# Patient Record
Sex: FEMALE | Race: OTHER | HISPANIC OR LATINO | URBAN - METROPOLITAN AREA
[De-identification: names, ages, dates, MRNs, and addresses within clinical notes are randomized per-mention and may not be internally consistent; named-entity substitution may affect disease eponyms.]

---

## 2023-03-03 ENCOUNTER — EMERGENCY (EMERGENCY)
Facility: HOSPITAL | Age: 88
LOS: 1 days | Discharge: HOME | End: 2023-03-03
Attending: STUDENT IN AN ORGANIZED HEALTH CARE EDUCATION/TRAINING PROGRAM
Payer: SELF-PAY

## 2023-03-03 VITALS
HEIGHT: 60 IN | RESPIRATION RATE: 20 BRPM | OXYGEN SATURATION: 98 % | SYSTOLIC BLOOD PRESSURE: 137 MMHG | HEART RATE: 81 BPM | TEMPERATURE: 98 F | DIASTOLIC BLOOD PRESSURE: 54 MMHG | WEIGHT: 110.01 LBS

## 2023-03-03 VITALS
SYSTOLIC BLOOD PRESSURE: 126 MMHG | OXYGEN SATURATION: 98 % | TEMPERATURE: 98 F | DIASTOLIC BLOOD PRESSURE: 55 MMHG | RESPIRATION RATE: 18 BRPM | HEART RATE: 74 BPM

## 2023-03-03 LAB
ALBUMIN SERPL ELPH-MCNC: 3.2 G/DL — LOW (ref 3.3–5.2)
ALP SERPL-CCNC: 92 U/L — SIGNIFICANT CHANGE UP (ref 40–120)
ALT FLD-CCNC: 28 U/L — SIGNIFICANT CHANGE UP
ANION GAP SERPL CALC-SCNC: 12 MMOL/L — SIGNIFICANT CHANGE UP (ref 5–17)
ANISOCYTOSIS BLD QL: SLIGHT — SIGNIFICANT CHANGE UP
APTT BLD: 32.6 SEC — SIGNIFICANT CHANGE UP (ref 27.5–35.5)
AST SERPL-CCNC: 18 U/L — SIGNIFICANT CHANGE UP
BASOPHILS # BLD AUTO: 0 K/UL — SIGNIFICANT CHANGE UP (ref 0–0.2)
BASOPHILS NFR BLD AUTO: 0 % — SIGNIFICANT CHANGE UP (ref 0–2)
BILIRUB SERPL-MCNC: 0.4 MG/DL — SIGNIFICANT CHANGE UP (ref 0.4–2)
BUN SERPL-MCNC: 36.5 MG/DL — HIGH (ref 8–20)
BURR CELLS BLD QL SMEAR: PRESENT — SIGNIFICANT CHANGE UP
CALCIUM SERPL-MCNC: 9.3 MG/DL — SIGNIFICANT CHANGE UP (ref 8.4–10.5)
CHLORIDE SERPL-SCNC: 104 MMOL/L — SIGNIFICANT CHANGE UP (ref 96–108)
CO2 SERPL-SCNC: 21 MMOL/L — LOW (ref 22–29)
CREAT SERPL-MCNC: 0.89 MG/DL — SIGNIFICANT CHANGE UP (ref 0.5–1.3)
DACRYOCYTES BLD QL SMEAR: SLIGHT — SIGNIFICANT CHANGE UP
EGFR: 63 ML/MIN/1.73M2 — SIGNIFICANT CHANGE UP
EOSINOPHIL # BLD AUTO: 0.08 K/UL — SIGNIFICANT CHANGE UP (ref 0–0.5)
EOSINOPHIL NFR BLD AUTO: 0.9 % — SIGNIFICANT CHANGE UP (ref 0–6)
GIANT PLATELETS BLD QL SMEAR: PRESENT — SIGNIFICANT CHANGE UP
GLUCOSE SERPL-MCNC: 116 MG/DL — HIGH (ref 70–99)
HCT VFR BLD CALC: 33.8 % — LOW (ref 34.5–45)
HGB BLD-MCNC: 10.5 G/DL — LOW (ref 11.5–15.5)
HIV 1 & 2 AB SERPL IA.RAPID: SIGNIFICANT CHANGE UP
INR BLD: 1.01 RATIO — SIGNIFICANT CHANGE UP (ref 0.88–1.16)
LYMPHOCYTES # BLD AUTO: 2.11 K/UL — SIGNIFICANT CHANGE UP (ref 1–3.3)
LYMPHOCYTES # BLD AUTO: 22.6 % — SIGNIFICANT CHANGE UP (ref 13–44)
MACROCYTES BLD QL: SLIGHT — SIGNIFICANT CHANGE UP
MANUAL SMEAR VERIFICATION: SIGNIFICANT CHANGE UP
MCHC RBC-ENTMCNC: 30.2 PG — SIGNIFICANT CHANGE UP (ref 27–34)
MCHC RBC-ENTMCNC: 31.1 GM/DL — LOW (ref 32–36)
MCV RBC AUTO: 97.1 FL — SIGNIFICANT CHANGE UP (ref 80–100)
MONOCYTES # BLD AUTO: 0.64 K/UL — SIGNIFICANT CHANGE UP (ref 0–0.9)
MONOCYTES NFR BLD AUTO: 6.9 % — SIGNIFICANT CHANGE UP (ref 2–14)
NEUTROPHILS # BLD AUTO: 6.4 K/UL — SIGNIFICANT CHANGE UP (ref 1.8–7.4)
NEUTROPHILS NFR BLD AUTO: 67 % — SIGNIFICANT CHANGE UP (ref 43–77)
NEUTS BAND # BLD: 1.7 % — SIGNIFICANT CHANGE UP (ref 0–8)
OVALOCYTES BLD QL SMEAR: SLIGHT — SIGNIFICANT CHANGE UP
PLAT MORPH BLD: NORMAL — SIGNIFICANT CHANGE UP
PLATELET # BLD AUTO: 122 K/UL — LOW (ref 150–400)
POIKILOCYTOSIS BLD QL AUTO: SLIGHT — SIGNIFICANT CHANGE UP
POLYCHROMASIA BLD QL SMEAR: SLIGHT — SIGNIFICANT CHANGE UP
POTASSIUM SERPL-MCNC: 4.6 MMOL/L — SIGNIFICANT CHANGE UP (ref 3.5–5.3)
POTASSIUM SERPL-SCNC: 4.6 MMOL/L — SIGNIFICANT CHANGE UP (ref 3.5–5.3)
PROT SERPL-MCNC: 6.9 G/DL — SIGNIFICANT CHANGE UP (ref 6.6–8.7)
PROTHROM AB SERPL-ACNC: 11.7 SEC — SIGNIFICANT CHANGE UP (ref 10.5–13.4)
RBC # BLD: 3.48 M/UL — LOW (ref 3.8–5.2)
RBC # FLD: 25.2 % — HIGH (ref 10.3–14.5)
RBC BLD AUTO: SIGNIFICANT CHANGE UP
SODIUM SERPL-SCNC: 136 MMOL/L — SIGNIFICANT CHANGE UP (ref 135–145)
VARIANT LYMPHS # BLD: 0.9 % — SIGNIFICANT CHANGE UP (ref 0–6)
WBC # BLD: 9.32 K/UL — SIGNIFICANT CHANGE UP (ref 3.8–10.5)
WBC # FLD AUTO: 9.32 K/UL — SIGNIFICANT CHANGE UP (ref 3.8–10.5)

## 2023-03-03 PROCEDURE — 99284 EMERGENCY DEPT VISIT MOD MDM: CPT

## 2023-03-03 PROCEDURE — 86703 HIV-1/HIV-2 1 RESULT ANTBDY: CPT

## 2023-03-03 PROCEDURE — 70450 CT HEAD/BRAIN W/O DYE: CPT | Mod: MA

## 2023-03-03 PROCEDURE — 80053 COMPREHEN METABOLIC PANEL: CPT

## 2023-03-03 PROCEDURE — 85610 PROTHROMBIN TIME: CPT

## 2023-03-03 PROCEDURE — 99284 EMERGENCY DEPT VISIT MOD MDM: CPT | Mod: 25

## 2023-03-03 PROCEDURE — 36415 COLL VENOUS BLD VENIPUNCTURE: CPT

## 2023-03-03 PROCEDURE — 85025 COMPLETE CBC W/AUTO DIFF WBC: CPT

## 2023-03-03 PROCEDURE — 87040 BLOOD CULTURE FOR BACTERIA: CPT

## 2023-03-03 PROCEDURE — 85730 THROMBOPLASTIN TIME PARTIAL: CPT

## 2023-03-03 PROCEDURE — 70450 CT HEAD/BRAIN W/O DYE: CPT | Mod: 26,MA

## 2023-03-03 PROCEDURE — T1013: CPT

## 2023-03-03 RX ORDER — ACETAMINOPHEN 500 MG
650 TABLET ORAL ONCE
Refills: 0 | Status: COMPLETED | OUTPATIENT
Start: 2023-03-03 | End: 2023-03-03

## 2023-03-03 RX ORDER — NIFEDIPINE 30 MG
1 TABLET, EXTENDED RELEASE 24 HR ORAL
Qty: 42 | Refills: 0
Start: 2023-03-03 | End: 2023-03-16

## 2023-03-03 RX ORDER — LOSARTAN POTASSIUM 100 MG/1
1 TABLET, FILM COATED ORAL
Qty: 28 | Refills: 0
Start: 2023-03-03 | End: 2023-03-16

## 2023-03-03 RX ORDER — OMEPRAZOLE 10 MG/1
1 CAPSULE, DELAYED RELEASE ORAL
Qty: 14 | Refills: 0
Start: 2023-03-03 | End: 2023-03-16

## 2023-03-03 RX ORDER — METFORMIN HYDROCHLORIDE 850 MG/1
1 TABLET ORAL
Qty: 28 | Refills: 0
Start: 2023-03-03 | End: 2023-03-16

## 2023-03-03 RX ORDER — HYDROCHLOROTHIAZIDE 25 MG
1 TABLET ORAL
Qty: 14 | Refills: 0
Start: 2023-03-03 | End: 2023-03-16

## 2023-03-03 RX ORDER — LINAGLIPTIN 5 MG/1
1 TABLET, FILM COATED ORAL
Qty: 14 | Refills: 0
Start: 2023-03-03 | End: 2023-03-16

## 2023-03-03 RX ADMIN — Medication 650 MILLIGRAM(S): at 21:32

## 2023-03-03 RX ADMIN — Medication 50 MILLIGRAM(S): at 21:32

## 2023-03-03 NOTE — ED PROVIDER NOTE - PROGRESS NOTE DETAILS
Laureano:  Labs and CT results discussed with family. Daughter is requesting medication to be prescribed as the patient likely is not going ot return to [place] as originally scheduled on march 8th.

## 2023-03-03 NOTE — ED PROVIDER NOTE - NEUROLOGICAL, MLM
Alert and oriented, no focal deficits, no motor or sensory deficits, DTR's symmetric, slight ataxia- at baseline.

## 2023-03-03 NOTE — ED PROVIDER NOTE - PHYSICAL EXAMINATION
Scalp lesions: two large thickened brown plaque like growths lesions along the right parietal scalp ( both about 4x2 cm), and one superficial ulcerated lesion to the left occipital scalp (2x1cm). Non tender, non-erythema, no discharge, and nonodorous.

## 2023-03-03 NOTE — ED PROVIDER NOTE - PATIENT PORTAL LINK FT
You can access the FollowMyHealth Patient Portal offered by [facility] by registering at the following website: [email or web address]. By joining [facility]’s FollowMyHealth portal, you will also be able to view your health information using other applications (apps) compatible with our system.

## 2023-03-03 NOTE — ED PROVIDER NOTE - NSFOLLOWUPINSTRUCTIONS_ED_ALL_ED_FT
1) Sergey un seguimiento con el centro de marie del [facility] para analizar más estudios de maharaj dolor de lottie en el cuero cabelludo y lesiones en el cuero cabelludo.  2) Regrese a la tapan de emergencias por empeoramiento o síntomas preocupantes  3) Considere agregar ibuprofeno y/o paracetamol para controlar el dolor  4) Continúe con maharaj otro medicamento    1) Follow up with the [facility] to discuss further work-up of your scalp head pain and lesions on the scalp  2) Return to the ER for worsening or concerning symptoms  3)Consider adding ibuprofen and/or acetaminophen for pain control  4) Continue with your other medications          Dolor de lottie general sin causa    General Headache Without Cause      El dolor de lottie es un dolor o malestar que se siente en la jayant de la lottie o del adriane. Hay muchas causas y tipos de malvin de lottie. Entre los tipos más frecuentes, se incluyen los siguientes:  •Cefaleas tensionales.      •Cefaleas migrañosas.      •Cefalea en brotes.      •Cefaleas diarias crónicas.      En ocasiones, el dolor de lottie puede no tener diego causa específica.      Siga estas indicaciones en maharaj casa:    Controle maharaj afección para detectar cualquier cambio. Informe a maharaj médico acerca de cualquier cambio. Siga estos pasos para controlar maharaj afección:      Control del dolor       A bag of ice on a towel on the skin.        A heating pad for use on the painful area.      •Use los medicamentos de venta stacy y los recetados solamente jessica se lo haya indicado el médico. El tratamiento puede incluir medicamentos para el dolor que se thu por boca o que se aplican sobre la piel.      •Cuando sienta dolor de lottie acuéstese en un cuarto oscuro y tranquilo.      •Mantenga las luces tenues si las luces brillantes le molestan o holley malvin de lottie empeoran.    •Si se lo indican, aplíquese hielo en la lottie y en la jayant del adriane:  •Ponga el hielo en diego bolsa plástica.      •Coloque diego toalla entre la piel y la bolsa.      •Aplique el hielo isaura 20 minutos, 2 a 3 veces al día.      •Retire el hielo si la piel se pone de color box brillante. [place] es muy importante. Si no puede sentir dolor, calor o frío, tiene un mayor riesgo de que se dañe la jayant.      •Si se lo indican, aplique calor en la jayant afectada. Use la chetna de calor que el médico le recomiende, jessica diego compresa de calor húmedo o diego almohadilla térmica.  •Coloque diego toalla entre la piel y la chetna de calor.      •Aplique calor isaura 20 a 30 minutos.      •Retire la chetna de calor si la piel se pone de color box brillante. [place] es especialmente importante si no puede sentir dolor, calor o frío. Corre un mayor riesgo de sufrir quemaduras.        Comida y bebida     •Mantenga un horario para las comidas.    •Si urban alcohol:•Limite la cantidad que urban a lo siguiente:  •De 0 a 1 medida por día para las mujeres que no están embarazadas.      • De 0 a 2 medidas por día para los hombres.         •Sepa cuánta cantidad de alcohol hay en las bebidas. En [place], diego medida equivale a diego botella de cerveza de 12 oz (355 ml), un vaso de vino de 5 oz (148 ml) o un vaso de diego bebida alcohólica de gilda graduación de 1½ oz (44 ml).        •Deje de mart cafeína o disminuya la cantidad que consume.      •Melissa suficiente líquido jessica para mantener la orina de color amarillo pálido.        Indicaciones generales   A person writing in a journal.  •Lleve un diario de los malvin de lottie para averiguar qué factores pueden desencadenarlos. Registre, por ejemplo, lo siguiente:  •Lo que usted come y urban.      •El tiempo que duerme.      •Algún cambio en maharaj dieta o en los medicamentos.        •Pruebe algunas técnicas de relajación, jessica los masajes.      •Limite el estrés.      •Siéntese con la espalda recta y no tense los músculos.      • No consuma ningún producto que contenga nicotina o tabaco. Estos productos incluyen cigarrillos, tabaco para mascar y aparatos de vapeo, jessica los cigarrillos electrónicos. Si necesita ayuda para dejar de consumir estos productos, consulte al médico.      •Sergey actividad física habitualmente jessica se lo haya indicado el médico.      •Tenga un horario fijo para dormir. Duerma entre 7 y 9 horas todas las noches o la cantidad de horas que le haya recomendado el médico.      •Concurra a todas las visitas de seguimiento. [place] es importante.        Comuníquese con un médico si:    •Los medicamentos no le alivian los síntomas.      •Tiene un dolor de lottie que es diferente de la cefalea habitual.      •Tiene náuseas o vómitos.      •Tiene fiebre.        Solicite ayuda de inmediato si:  •El dolor de lottie:  •Se vuelve intenso rápidamente.      •Empeora después de hacer actividad física moderada o intensa.      •Tiene alguno de estos síntomas:  •Vómitos repetidos.      •Dolor o rigidez en el adriane.      •Cambios en la visión.      •Dolor en un korey o en un oído.      •Problemas para hablar.      •Debilidad muscular o pérdida del control muscular.      •Pérdida del equilibrio o de la coordinación.        •Sufre mareos o se desmaya.      •Experimenta confusión.      •Tiene diego convulsión.      Estos síntomas pueden representar un problema grave que constituye diego emergencia. No espere a angel si los síntomas desaparecen. Solicite atención médica de inmediato. Comuníquese con el servicio de emergencias de maharaj localidad (911 en los [place]). No conduzca por holley propios medios hasta el hospital.       Resumen    •El dolor de lottie es un dolor o malestar que se siente en la jayant de la lottie o del adriane.      •Hay muchas causas y tipos de malvin de lottie. En algunos casos, es posible que no se encuentre la causa.      •Lleve un diario de los malvin de lottie para averiguar qué factores pueden desencadenarlos. Controle maharaj afección para detectar cualquier cambio. Informe a maharaj médico acerca de cualquier cambio.      •Comuníquese con un médico si tiene un dolor de lottie que es diferente de lo habitual o si los síntomas no se alivian con los medicamentos.      •Solicite ayuda de inmediato si el dolor se vuelve intenso, vomita, tiene pérdida de la visión, pierde el equilibrio o tiene diego convulsión.      Esta información no tiene jessica fin reemplazar el consejo del médico. Asegúrese de hacerle al médico cualquier pregunta que tenga. 1) Sergey un seguimiento con el centro de marie del [facility] para analizar más estudios de maharaj dolor de lottie en el cuero cabelludo y lesiones en el cuero cabelludo.  2) Regrese a la tapan de emergencias por empeoramiento o síntomas preocupantes  3) Considere agregar ibuprofeno y/o paracetamol para controlar el dolor  4) Continúe con maharaj otro medicamento    1) Follow up with the [facility] to discuss further work-up of your scalp head pain and lesions on the scalp  2) Return to the ER for worsening or concerning symptoms  3)Consider adding ibuprofen and/or acetaminophen for pain control  4) Continue with your other medications    Dolor de lottie general sin causa    General Headache Without Cause      El dolor de lottie es un dolor o malestar que se siente en la jayant de la lottie o del adriane. Hay muchas causas y tipos de malvin de lottie. Entre los tipos más frecuentes, se incluyen los siguientes:  •Cefaleas tensionales.      •Cefaleas migrañosas.      •Cefalea en brotes.      •Cefaleas diarias crónicas.      En ocasiones, el dolor de lottie puede no tener diego causa específica.      Siga estas indicaciones en maharaj casa:    Controle maharaj afección para detectar cualquier cambio. Informe a maharaj médico acerca de cualquier cambio. Siga estos pasos para controlar maharaj afección:      Control del dolor       A bag of ice on a towel on the skin.        A heating pad for use on the painful area.      •Use los medicamentos de venta stacy y los recetados solamente jessica se lo haya indicado el médico. El tratamiento puede incluir medicamentos para el dolor que se thu por boca o que se aplican sobre la piel.      •Cuando sienta dolor de lottie acuéstese en un cuarto oscuro y tranquilo.      •Mantenga las luces tenues si las luces brillantes le molestan o holley malvin de lottie empeoran.    •Si se lo indican, aplíquese hielo en la lottie y en la jayant del adriane:  •Ponga el hielo en diego bolsa plástica.      •Coloque diego toalla entre la piel y la bolsa.      •Aplique el hielo isaura 20 minutos, 2 a 3 veces al día.      •Retire el hielo si la piel se pone de color box brillante. [place] es muy importante. Si no puede sentir dolor, calor o frío, tiene un mayor riesgo de que se dañe la jayant.      •Si se lo indican, aplique calor en la jayant afectada. Use la chetna de calor que el médico le recomiende, jessica diego compresa de calor húmedo o diego almohadilla térmica.  •Coloque diego toalla entre la piel y la chetna de calor.      •Aplique calor isaura 20 a 30 minutos.      •Retire la chetna de calor si la piel se pone de color box brillante. [place] es especialmente importante si no puede sentir dolor, calor o frío. Corre un mayor riesgo de sufrir quemaduras.        Comida y bebida     •Mantenga un horario para las comidas.    •Si urban alcohol:•Limite la cantidad que urban a lo siguiente:  •De 0 a 1 medida por día para las mujeres que no están embarazadas.      • De 0 a 2 medidas por día para los hombres.         •Sepa cuánta cantidad de alcohol hay en las bebidas. En [place], diego medida equivale a diego botella de cerveza de 12 oz (355 ml), un vaso de vino de 5 oz (148 ml) o un vaso de diego bebida alcohólica de gilda graduación de 1½ oz (44 ml).        •Deje de mart cafeína o disminuya la cantidad que consume.      •Melissa suficiente líquido jessica para mantener la orina de color amarillo pálido.        Indicaciones generales   A person writing in a journal.  •Lleve un diario de los malvin de lottie para averiguar qué factores pueden desencadenarlos. Registre, por ejemplo, lo siguiente:  •Lo que usted come y urban.      •El tiempo que duerme.      •Algún cambio en maharaj dieta o en los medicamentos.        •Pruebe algunas técnicas de relajación, jessica los masajes.      •Limite el estrés.      •Siéntese con la espalda recta y no tense los músculos.      • No consuma ningún producto que contenga nicotina o tabaco. Estos productos incluyen cigarrillos, tabaco para mascar y aparatos de vapeo, jessica los cigarrillos electrónicos. Si necesita ayuda para dejar de consumir estos productos, consulte al médico.      •Sergey actividad física habitualmente jessica se lo haya indicado el médico.      •Tenga un horario fijo para dormir. Duerma entre 7 y 9 horas todas las noches o la cantidad de horas que le haya recomendado el médico.      •Concurra a todas las visitas de seguimiento. [place] es importante.        Comuníquese con un médico si:    •Los medicamentos no le alivian los síntomas.      •Tiene un dolor de lottie que es diferente de la cefalea habitual.      •Tiene náuseas o vómitos.      •Tiene fiebre.        Solicite ayuda de inmediato si:  •El dolor de lottie:  •Se vuelve intenso rápidamente.      •Empeora después de hacer actividad física moderada o intensa.      •Tiene alguno de estos síntomas:  •Vómitos repetidos.      •Dolor o rigidez en el adriane.      •Cambios en la visión.      •Dolor en un korey o en un oído.      •Problemas para hablar.      •Debilidad muscular o pérdida del control muscular.      •Pérdida del equilibrio o de la coordinación.        •Sufre mareos o se desmaya.      •Experimenta confusión.      •Tiene diego convulsión.      Estos síntomas pueden representar un problema grave que constituye diego emergencia. No espere a angel si los síntomas desaparecen. Solicite atención médica de inmediato. Comuníquese con el servicio de emergencias de maharaj localidad (911 en los [place]). No conduzca por holley propios medios hasta el hospital.       Resumen    •El dolor de lottie es un dolor o malestar que se siente en la jayant de la lottie o del adriane.      •Hay muchas causas y tipos de malvin de lottie. En algunos casos, es posible que no se encuentre la causa.      •Lleve un diario de los malvin de lottie para averiguar qué factores pueden desencadenarlos. Controle maharaj afección para detectar cualquier cambio. Informe a maharaj médico acerca de cualquier cambio.      •Comuníquese con un médico si tiene un dolor de lottie que es diferente de lo habitual o si los síntomas no se alivian con los medicamentos.      •Solicite ayuda de inmediato si el dolor se vuelve intenso, vomita, tiene pérdida de la visión, pierde el equilibrio o tiene diego convulsión.      Esta información no tiene jessica fin reemplazar el consejo del médico. Asegúrese de hacerle al médico cualquier pregunta que tenga.

## 2023-03-03 NOTE — ED ADULT NURSE NOTE - NSIMPLEMENTINTERV_GEN_ALL_ED
Implemented All Fall with Harm Risk Interventions:  [place] to call system. Call bell, personal items and telephone within reach. Instruct patient to call for assistance. Room bathroom lighting operational. Non-slip footwear when patient is off stretcher. Physically safe environment: no spills, clutter or unnecessary equipment. Stretcher in lowest position, wheels locked, appropriate side rails in place. Provide visual cue, wrist band, yellow gown, etc. Monitor gait and stability. Monitor for mental status changes and reorient to person, place, and time. Review medications for side effects contributing to fall risk. Reinforce activity limits and safety measures with patient and family. Provide visual clues: red socks.

## 2023-03-03 NOTE — ED ADULT NURSE NOTE - CHIEF COMPLAINT QUOTE
lives in [facility] 2/28. was being treated for scalp lesions (two large patches to scalp) today daughter noticed pus and drainage from lesions. Hx DM

## 2023-03-03 NOTE — ED PROVIDER NOTE - OBJECTIVE STATEMENT
88 yo female with hx of HTN, diabetes, gastritis presents for evaluation of recurrent head pains for the past couple of months. Patient and daughter states she developed new lesions to her scalp recurrent headaches in December. She states the headaches are intermittent in nature. When they occur they are sharp, intense, and no pattern to when they occur. She was recently admitted for over 20 days in a hospital in [place]. Both the patient and daughter ( at bedside) are unclear of what they found. She denies any procedures, CT, or biospy. She states the patient was on acyclovir, mometasone cream, and some other medication. Since leaving [place] she was taken off the new medication and discharged on omeprazole. metformin, hctz, Nifedipine, Losartan, and Jardiance ( given samples). Daughter states the patient doesn't have her discharge paperwork when she was taken out of the hospital. He granddaughter went to see her and was concerned that nothing was being done and cause was not found despite the patient continuing to have pain. She states the patient was discharged and [place]. Around the time the headaches started the patient was placed on additional medication for diabetes ( Jardiance and Trayenta) in addition to her metformin. She also was on pregabalin. Patient currently in no pain. 88 yo female with hx of HTN, diabetes, gastritis presents for evaluation of recurrent head pains for the past couple of months. Patient and daughter states she developed new lesions to her scalp recurrent headaches in December. She states the headaches are intermittent in nature. When they occur they are sharp, intense, and no pattern to when they occur. She was recently admitted for over 20 days in a hospital in [place]. Both the patient and daughter ( at bedside) are unclear of what they found. She denies any procedures, CT, or biospy. She states the patient was on acyclovir, mometasone cream, and some other medication. Since leaving [place] she was taken off the new medication and discharged on omeprazole. metformin, hctz, Nifedipine, Losartan, and Jardiance ( given samples). Patient dneis other lesions, or previous growths. Denies hx of cancer, HIV, or neurologic symptoms. Daughter states the patient doesn't have her discharge paperwork when she was taken out of the hospital. He granddaughter went to see her and was concerned that nothing was being done and cause was not found despite the patient continuing to have pain. She states the patient was discharged and [place] Around the time the headaches started the patient was placed on additional medication for diabetes ( Jardiance and Trayenta) in addition to her metformin. She also was on pregabalin. Patient currently in no pain. 88 yo female with hx of HTN, diabetes, gastritis presents for evaluation of recurrent head pains for the past couple of months. Patient and daughter states she developed new lesions to her scalp recurrent headaches in December. She states the headaches are intermittent in nature. When they occur they are sharp, intense, and no pattern to when they occur. She was recently admitted for over 20 days in a hospital in [place]. Both the patient and daughter ( at bedside) are unclear of what they found. She denies any procedures, CT, or biopsy. She states the patient was on acyclovir, mometasone cream, and some other medication. Since leaving [place] she was taken off the new medication and discharged on omeprazole. metformin, HCTZ, Nifedipine, Losartan, and Jardiance ( given samples). Patient denies other lesions, or previous growths. Denies hx of cancer, HIV, or neurologic symptoms. Daughter states the patient doesn't have her discharge paperwork when she was taken out of the hospital. He granddaughter went to see her and was concerned that nothing was being done and cause was not found despite the patient continuing to have pain. She states the patient was discharged and [place] Around the time the headaches started the patient was placed on additional medication for diabetes ( Jardiance and Trayenta) in addition to her metformin. She also was on pregabalin. Patient currently in no pain. 86 yo female with hx of HTN, diabetes, gastritis presents for evaluation of recurrent head pains for the past couple of months. Patient and daughter states she developed new lesions to her scalp recurrent headaches in December. She states the headaches are intermittent in nature. When they occur they are sharp, intense, and no pattern to when they occur. She was also admitted for over 20 days in a hospital in [place] during December. Both the patient and daughter ( at bedside) are unclear of what they found. She denies any procedures, CT, or biopsy. She states the patient was on acyclovir, mometasone cream, and some other medication for 5 days after her discharge from the hospital. After the five days patient has only been on her normal medication; omeprazole. metformin, HCTZ, Nifedipine, Losartan, and Jardiance ( given samples). Patient denies other lesions, or previous growths. Denies hx of cancer, HIV, or neurologic symptoms. Daughter states the patient doesn't have her discharge paperwork when she was taken out of the hospital. He granddaughter went to see her and was concerned that nothing was being done and cause was not found despite the patient continuing to have pain. She states the patient was discharged and [place] Around the time the headaches started the patient was placed on additional medication for diabetes ( Jardiance and Trayenta) in addition to her metformin. She also was on pregabalin. Patient currently in no pain.

## 2023-03-03 NOTE — ED ADULT TRIAGE NOTE - CHIEF COMPLAINT QUOTE
lives in [facility]. was being treated for scalp lesions (two large patches to scalp) today daughter noticed pus and drainage from lesions. Hx DM

## 2023-03-03 NOTE — ED ADULT NURSE REASSESSMENT NOTE - NS ED NURSE REASSESS COMMENT FT1
Patient discharged home with all belongings. Med rec and discharge instructions explained. Patient verbalizes understanding on physician follow up, medication regimen and next dose, s/s of complications and monitoring.  Activity restrictions and lifestyle modifications reviewed. VSS. IV removed, intact. No distress noted.

## 2023-03-03 NOTE — ED ADULT NURSE NOTE - CAS ELECT INFOMATION PROVIDED
Pt and family verbalize understanding of all d/c instructions./DC instructions Patient discharged home with all belongings. Med rec and discharge instructions explained. Patient verbalizes understanding on physician follow up, medication regimen and next dose, s/s of complications and monitoring.  Activity restrictions and lifestyle modifications reviewed. VSS. IV removed, intact. No distress noted./DC instructions

## 2023-03-03 NOTE — ED ADULT NURSE NOTE - OBJECTIVE STATEMENT
Pt BIB daughter c/o head lesions to scalp recent arrival from [facility] hx dm in no acute distress pending md chau conklin rn

## 2023-03-03 NOTE — ED PROVIDER NOTE - CLINICAL SUMMARY MEDICAL DECISION MAKING FREE TEXT BOX
86 yo female with hx of HTN, diabetes, gastritis presents for evaluation of recurrent head pains and new scalp growths for the past couple of months. Patient with grossly normal examination except for scalp lesions. Patient's work-up is negative in the ED without significant acute process noted. Patient to be discharged to follow-up with [facility] center and to attempt to obtain records from her hospitalization in [place].

## 2023-03-03 NOTE — ED ADULT NURSE REASSESSMENT NOTE - NS ED NURSE REASSESS COMMENT FT1
Assumed care of patient at 19:15. Charting as noted. Patient A&Ox4, resp even/unlabored. At time of assessment, patient reports no pain/discomfort, denies CP/SOB. Patient updated on the plan of care, awaiting CT. Stretcher locked in lowest position, IV site flushed w/ NS. No redness, swelling or pain noted to site. No signs of acute distress noted, all safety measures in place, daughter at bed side

## 2023-03-09 LAB
CULTURE RESULTS: SIGNIFICANT CHANGE UP
CULTURE RESULTS: SIGNIFICANT CHANGE UP
SPECIMEN SOURCE: SIGNIFICANT CHANGE UP
SPECIMEN SOURCE: SIGNIFICANT CHANGE UP